# Patient Record
Sex: MALE | Race: WHITE | NOT HISPANIC OR LATINO | Employment: FULL TIME | URBAN - METROPOLITAN AREA
[De-identification: names, ages, dates, MRNs, and addresses within clinical notes are randomized per-mention and may not be internally consistent; named-entity substitution may affect disease eponyms.]

---

## 2022-08-02 ENCOUNTER — HOSPITAL ENCOUNTER (OUTPATIENT)
Facility: HOSPITAL | Age: 57
Discharge: HOME | End: 2022-08-03
Attending: EMERGENCY MEDICINE | Admitting: INTERNAL MEDICINE
Payer: COMMERCIAL

## 2022-08-02 DIAGNOSIS — R06.02 SHORTNESS OF BREATH: ICD-10-CM

## 2022-08-02 DIAGNOSIS — R07.9 CHEST PAIN: ICD-10-CM

## 2022-08-02 DIAGNOSIS — I16.0 HYPERTENSIVE URGENCY: Primary | ICD-10-CM

## 2022-08-02 DIAGNOSIS — R06.09 DYSPNEA ON EXERTION: ICD-10-CM

## 2022-08-02 DIAGNOSIS — R73.03 PREDIABETES: ICD-10-CM

## 2022-08-02 DIAGNOSIS — Z78.9 ALCOHOL USE: ICD-10-CM

## 2022-08-02 DIAGNOSIS — F41.9 ANXIETY: ICD-10-CM

## 2022-08-02 DIAGNOSIS — E78.5 HYPERLIPIDEMIA, UNSPECIFIED HYPERLIPIDEMIA TYPE: ICD-10-CM

## 2022-08-02 DIAGNOSIS — E87.70 HYPERVOLEMIA, UNSPECIFIED HYPERVOLEMIA TYPE: ICD-10-CM

## 2022-08-02 DIAGNOSIS — R79.89 ELEVATED BRAIN NATRIURETIC PEPTIDE (BNP) LEVEL: ICD-10-CM

## 2022-08-02 DIAGNOSIS — R07.9 CHEST PAIN, UNSPECIFIED TYPE: ICD-10-CM

## 2022-08-02 DIAGNOSIS — I10 PRIMARY HYPERTENSION: ICD-10-CM

## 2022-08-02 LAB
ALBUMIN SERPL BCP-MCNC: 4 G/DL (ref 3.5–5.2)
ALP SERPL-CCNC: 79 U/L (ref 55–135)
ALT SERPL W/O P-5'-P-CCNC: 32 U/L (ref 10–44)
ANION GAP SERPL CALC-SCNC: 13 MMOL/L (ref 8–16)
AST SERPL-CCNC: 28 U/L (ref 10–40)
BASOPHILS # BLD AUTO: 0.06 K/UL (ref 0–0.2)
BASOPHILS NFR BLD: 0.8 % (ref 0–1.9)
BILIRUB SERPL-MCNC: 0.7 MG/DL (ref 0.1–1)
BNP SERPL-MCNC: 142 PG/ML (ref 0–99)
BUN SERPL-MCNC: 11 MG/DL (ref 6–20)
CALCIUM SERPL-MCNC: 8.9 MG/DL (ref 8.7–10.5)
CHLORIDE SERPL-SCNC: 104 MMOL/L (ref 95–110)
CHOLEST SERPL-MCNC: 176 MG/DL (ref 120–199)
CHOLEST/HDLC SERPL: 2.8 {RATIO} (ref 2–5)
CO2 SERPL-SCNC: 25 MMOL/L (ref 23–29)
CREAT SERPL-MCNC: 0.8 MG/DL (ref 0.5–1.4)
CTP QC/QA: YES
DIFFERENTIAL METHOD: ABNORMAL
EOSINOPHIL # BLD AUTO: 0.2 K/UL (ref 0–0.5)
EOSINOPHIL NFR BLD: 2.7 % (ref 0–8)
ERYTHROCYTE [DISTWIDTH] IN BLOOD BY AUTOMATED COUNT: 12.4 % (ref 11.5–14.5)
EST. GFR  (NO RACE VARIABLE): >60 ML/MIN/1.73 M^2
ESTIMATED AVG GLUCOSE: 123 MG/DL (ref 68–131)
GLUCOSE SERPL-MCNC: 108 MG/DL (ref 70–110)
HBA1C MFR BLD: 5.9 % (ref 4–5.6)
HCT VFR BLD AUTO: 40.9 % (ref 40–54)
HDLC SERPL-MCNC: 64 MG/DL (ref 40–75)
HDLC SERPL: 36.4 % (ref 20–50)
HGB BLD-MCNC: 14 G/DL (ref 14–18)
IMM GRANULOCYTES # BLD AUTO: 0.03 K/UL (ref 0–0.04)
IMM GRANULOCYTES NFR BLD AUTO: 0.4 % (ref 0–0.5)
LACTATE SERPL-SCNC: 1.1 MMOL/L (ref 0.5–2.2)
LDLC SERPL CALC-MCNC: 94.6 MG/DL (ref 63–159)
LYMPHOCYTES # BLD AUTO: 1.2 K/UL (ref 1–4.8)
LYMPHOCYTES NFR BLD: 15.6 % (ref 18–48)
MCH RBC QN AUTO: 31.7 PG (ref 27–31)
MCHC RBC AUTO-ENTMCNC: 34.2 G/DL (ref 32–36)
MCV RBC AUTO: 93 FL (ref 82–98)
MONOCYTES # BLD AUTO: 0.5 K/UL (ref 0.3–1)
MONOCYTES NFR BLD: 7.1 % (ref 4–15)
NEUTROPHILS # BLD AUTO: 5.5 K/UL (ref 1.8–7.7)
NEUTROPHILS NFR BLD: 73.4 % (ref 38–73)
NONHDLC SERPL-MCNC: 112 MG/DL
NRBC BLD-RTO: 0 /100 WBC
PLATELET # BLD AUTO: 183 K/UL (ref 150–450)
PLATELET BLD QL SMEAR: ABNORMAL
PMV BLD AUTO: 10.8 FL (ref 9.2–12.9)
POTASSIUM SERPL-SCNC: 4.4 MMOL/L (ref 3.5–5.1)
PROT SERPL-MCNC: 7.7 G/DL (ref 6–8.4)
RBC # BLD AUTO: 4.41 M/UL (ref 4.6–6.2)
SARS-COV-2 RDRP RESP QL NAA+PROBE: NEGATIVE
SODIUM SERPL-SCNC: 142 MMOL/L (ref 136–145)
T4 FREE SERPL-MCNC: 0.96 NG/DL (ref 0.71–1.51)
TRIGL SERPL-MCNC: 87 MG/DL (ref 30–150)
TROPONIN I SERPL DL<=0.01 NG/ML-MCNC: 0.01 NG/ML (ref 0–0.03)
TROPONIN I SERPL DL<=0.01 NG/ML-MCNC: <0.006 NG/ML (ref 0–0.03)
TSH SERPL DL<=0.005 MIU/L-ACNC: 5.22 UIU/ML (ref 0.4–4)
WBC # BLD AUTO: 7.44 K/UL (ref 3.9–12.7)

## 2022-08-02 PROCEDURE — G0378 HOSPITAL OBSERVATION PER HR: HCPCS

## 2022-08-02 PROCEDURE — 63600175 PHARM REV CODE 636 W HCPCS: Performed by: STUDENT IN AN ORGANIZED HEALTH CARE EDUCATION/TRAINING PROGRAM

## 2022-08-02 PROCEDURE — 96372 THER/PROPH/DIAG INJ SC/IM: CPT | Mod: 59 | Performed by: STUDENT IN AN ORGANIZED HEALTH CARE EDUCATION/TRAINING PROGRAM

## 2022-08-02 PROCEDURE — 85025 COMPLETE CBC W/AUTO DIFF WBC: CPT | Performed by: NURSE PRACTITIONER

## 2022-08-02 PROCEDURE — 93010 ELECTROCARDIOGRAM REPORT: CPT | Mod: 76,,, | Performed by: INTERNAL MEDICINE

## 2022-08-02 PROCEDURE — 84484 ASSAY OF TROPONIN QUANT: CPT | Performed by: NURSE PRACTITIONER

## 2022-08-02 PROCEDURE — 83036 HEMOGLOBIN GLYCOSYLATED A1C: CPT | Performed by: STUDENT IN AN ORGANIZED HEALTH CARE EDUCATION/TRAINING PROGRAM

## 2022-08-02 PROCEDURE — 84484 ASSAY OF TROPONIN QUANT: CPT | Mod: 91 | Performed by: STUDENT IN AN ORGANIZED HEALTH CARE EDUCATION/TRAINING PROGRAM

## 2022-08-02 PROCEDURE — 93005 ELECTROCARDIOGRAM TRACING: CPT

## 2022-08-02 PROCEDURE — 80061 LIPID PANEL: CPT | Performed by: STUDENT IN AN ORGANIZED HEALTH CARE EDUCATION/TRAINING PROGRAM

## 2022-08-02 PROCEDURE — 80053 COMPREHEN METABOLIC PANEL: CPT | Performed by: NURSE PRACTITIONER

## 2022-08-02 PROCEDURE — U0002 COVID-19 LAB TEST NON-CDC: HCPCS | Performed by: EMERGENCY MEDICINE

## 2022-08-02 PROCEDURE — 25000003 PHARM REV CODE 250: Performed by: NURSE PRACTITIONER

## 2022-08-02 PROCEDURE — 84439 ASSAY OF FREE THYROXINE: CPT | Performed by: STUDENT IN AN ORGANIZED HEALTH CARE EDUCATION/TRAINING PROGRAM

## 2022-08-02 PROCEDURE — 93010 EKG 12-LEAD: ICD-10-PCS | Mod: ,,, | Performed by: INTERNAL MEDICINE

## 2022-08-02 PROCEDURE — 83880 ASSAY OF NATRIURETIC PEPTIDE: CPT | Performed by: NURSE PRACTITIONER

## 2022-08-02 PROCEDURE — 96375 TX/PRO/DX INJ NEW DRUG ADDON: CPT

## 2022-08-02 PROCEDURE — 93010 ELECTROCARDIOGRAM REPORT: CPT | Mod: ,,, | Performed by: INTERNAL MEDICINE

## 2022-08-02 PROCEDURE — 63600175 PHARM REV CODE 636 W HCPCS: Performed by: EMERGENCY MEDICINE

## 2022-08-02 PROCEDURE — 83605 ASSAY OF LACTIC ACID: CPT | Performed by: STUDENT IN AN ORGANIZED HEALTH CARE EDUCATION/TRAINING PROGRAM

## 2022-08-02 PROCEDURE — 99285 EMERGENCY DEPT VISIT HI MDM: CPT | Mod: 25

## 2022-08-02 PROCEDURE — 84443 ASSAY THYROID STIM HORMONE: CPT | Performed by: STUDENT IN AN ORGANIZED HEALTH CARE EDUCATION/TRAINING PROGRAM

## 2022-08-02 RX ORDER — SERTRALINE HYDROCHLORIDE 100 MG/1
100 TABLET, FILM COATED ORAL DAILY
Status: ON HOLD | COMMUNITY
End: 2022-08-03 | Stop reason: SDUPTHER

## 2022-08-02 RX ORDER — SERTRALINE HYDROCHLORIDE 100 MG/1
100 TABLET, FILM COATED ORAL DAILY
Status: DISCONTINUED | OUTPATIENT
Start: 2022-08-03 | End: 2022-08-03 | Stop reason: HOSPADM

## 2022-08-02 RX ORDER — LOSARTAN POTASSIUM 100 MG/1
100 TABLET ORAL DAILY
Status: ON HOLD | COMMUNITY
End: 2022-08-03 | Stop reason: SDUPTHER

## 2022-08-02 RX ORDER — AMLODIPINE BESYLATE 5 MG/1
5 TABLET ORAL DAILY
Status: DISCONTINUED | OUTPATIENT
Start: 2022-08-03 | End: 2022-08-03 | Stop reason: HOSPADM

## 2022-08-02 RX ORDER — IBUPROFEN 200 MG
16 TABLET ORAL
Status: DISCONTINUED | OUTPATIENT
Start: 2022-08-02 | End: 2022-08-03 | Stop reason: HOSPADM

## 2022-08-02 RX ORDER — SIMVASTATIN 40 MG/1
40 TABLET, FILM COATED ORAL DAILY
Status: ON HOLD | COMMUNITY
End: 2022-08-03 | Stop reason: SDUPTHER

## 2022-08-02 RX ORDER — ACETAMINOPHEN 325 MG/1
650 TABLET ORAL EVERY 6 HOURS PRN
Status: DISCONTINUED | OUTPATIENT
Start: 2022-08-02 | End: 2022-08-03 | Stop reason: HOSPADM

## 2022-08-02 RX ORDER — AMLODIPINE BESYLATE 2.5 MG/1
2.5 TABLET ORAL DAILY
Status: ON HOLD | COMMUNITY
End: 2022-08-03 | Stop reason: HOSPADM

## 2022-08-02 RX ORDER — NAPROXEN SODIUM 220 MG/1
81 TABLET, FILM COATED ORAL DAILY
Status: DISCONTINUED | OUTPATIENT
Start: 2022-08-03 | End: 2022-08-03 | Stop reason: HOSPADM

## 2022-08-02 RX ORDER — FUROSEMIDE 10 MG/ML
40 INJECTION INTRAMUSCULAR; INTRAVENOUS
Status: DISCONTINUED | OUTPATIENT
Start: 2022-08-03 | End: 2022-08-03 | Stop reason: HOSPADM

## 2022-08-02 RX ORDER — METOPROLOL TARTRATE 50 MG/1
50 TABLET ORAL DAILY
Status: DISCONTINUED | OUTPATIENT
Start: 2022-08-03 | End: 2022-08-03 | Stop reason: HOSPADM

## 2022-08-02 RX ORDER — NAPROXEN SODIUM 220 MG/1
81 TABLET, FILM COATED ORAL DAILY
Status: ON HOLD | COMMUNITY
End: 2022-08-03 | Stop reason: SDUPTHER

## 2022-08-02 RX ORDER — ACETAMINOPHEN 500 MG
1000 TABLET ORAL
Status: COMPLETED | OUTPATIENT
Start: 2022-08-02 | End: 2022-08-02

## 2022-08-02 RX ORDER — LOSARTAN POTASSIUM 50 MG/1
100 TABLET ORAL DAILY
Status: DISCONTINUED | OUTPATIENT
Start: 2022-08-03 | End: 2022-08-03 | Stop reason: HOSPADM

## 2022-08-02 RX ORDER — ATORVASTATIN CALCIUM 20 MG/1
20 TABLET, FILM COATED ORAL DAILY
Status: DISCONTINUED | OUTPATIENT
Start: 2022-08-03 | End: 2022-08-03 | Stop reason: HOSPADM

## 2022-08-02 RX ORDER — SODIUM CHLORIDE 0.9 % (FLUSH) 0.9 %
10 SYRINGE (ML) INJECTION EVERY 12 HOURS PRN
Status: DISCONTINUED | OUTPATIENT
Start: 2022-08-02 | End: 2022-08-03 | Stop reason: HOSPADM

## 2022-08-02 RX ORDER — IBUPROFEN 200 MG
24 TABLET ORAL
Status: DISCONTINUED | OUTPATIENT
Start: 2022-08-02 | End: 2022-08-03 | Stop reason: HOSPADM

## 2022-08-02 RX ORDER — METOPROLOL TARTRATE 50 MG/1
50 TABLET ORAL DAILY
Status: ON HOLD | COMMUNITY
End: 2022-08-03 | Stop reason: HOSPADM

## 2022-08-02 RX ORDER — ENOXAPARIN SODIUM 100 MG/ML
40 INJECTION SUBCUTANEOUS EVERY 24 HOURS
Status: DISCONTINUED | OUTPATIENT
Start: 2022-08-02 | End: 2022-08-03 | Stop reason: HOSPADM

## 2022-08-02 RX ORDER — GLUCAGON 1 MG
1 KIT INJECTION
Status: DISCONTINUED | OUTPATIENT
Start: 2022-08-02 | End: 2022-08-03 | Stop reason: HOSPADM

## 2022-08-02 RX ORDER — NALOXONE HCL 0.4 MG/ML
0.02 VIAL (ML) INJECTION
Status: DISCONTINUED | OUTPATIENT
Start: 2022-08-02 | End: 2022-08-03 | Stop reason: HOSPADM

## 2022-08-02 RX ORDER — FUROSEMIDE 10 MG/ML
40 INJECTION INTRAMUSCULAR; INTRAVENOUS
Status: COMPLETED | OUTPATIENT
Start: 2022-08-02 | End: 2022-08-02

## 2022-08-02 RX ADMIN — ACETAMINOPHEN 650 MG: 325 TABLET ORAL at 10:08

## 2022-08-02 RX ADMIN — FUROSEMIDE 40 MG: 10 INJECTION, SOLUTION INTRAMUSCULAR; INTRAVENOUS at 05:08

## 2022-08-02 RX ADMIN — ACETAMINOPHEN 1000 MG: 500 TABLET ORAL at 01:08

## 2022-08-02 RX ADMIN — ENOXAPARIN SODIUM 40 MG: 100 INJECTION SUBCUTANEOUS at 07:08

## 2022-08-02 NOTE — ED NOTES
Patient awake,  Resting comfortably in bed. Denies pain. In no acute distress. Still SOB. Talking on phone with family member

## 2022-08-02 NOTE — ED PROVIDER NOTES
Encounter Date: 8/2/2022       History     Chief Complaint   Patient presents with    Shortness of Breath     Sob with excessive coughing and unable to lay flat. Patient stated that he tries to lay down and gets winded. O2 saturations 97% on RA. Dyspnea with exertion      HPI   56-year-old male history of hypertension, obesity presenting with worsening dyspnea on exertion, shortness of breath  Patient is a , it has a poor diet, and sits for long periods   Denies chest pain, nausea vomiting, abdominal pain  Has not seen a physician in many years, had a stress test 8 years ago  Review of patient's allergies indicates:  No Known Allergies  No past medical history on file.  No past surgical history on file.  No family history on file.     Review of Systems   Constitutional: Negative for appetite change, chills, diaphoresis and fever.   HENT: Negative for congestion, nosebleeds, sore throat, trouble swallowing and voice change.    Eyes: Negative for photophobia, pain, redness and itching.   Respiratory: Positive for shortness of breath. Negative for cough and chest tightness.    Cardiovascular: Positive for leg swelling. Negative for chest pain.   Gastrointestinal: Negative for abdominal pain, constipation, diarrhea, nausea and vomiting.   Genitourinary: Negative for decreased urine volume, difficulty urinating, dysuria and frequency.   Musculoskeletal: Negative for gait problem.   Skin: Negative for color change, rash and wound.   Neurological: Negative for dizziness, facial asymmetry, speech difficulty and headaches.   Psychiatric/Behavioral: Negative for agitation, confusion and suicidal ideas.   All other systems reviewed and are negative.      Physical Exam     Initial Vitals [08/02/22 1242]   BP Pulse Resp Temp SpO2   (!) 183/94 69 (!) 22 98.3 °F (36.8 °C) 97 %      MAP       --         Physical Exam    Nursing note and vitals reviewed.  Constitutional:   EXAM  General: Awake, alert and oriented. No  acute distress.  Able to speak 3-4 word sentences before getting short of breath.     Head: normocephalic and atraumatic     Eyes: Conjunctivae are clear without exudates or hemorrhage. Sclera is non-icteric. EOM are intact. Eyelids are normal in appearance without swelling or lesions.     Ears: The external ear and ear canal are non-tender and without swelling. The canal is clear without discharge. Hearing intact.     Nose: Nares are patent bilaterally.     Neck: The neck is supple. Trachea is midline. Full ROM.     Cardiac: Regular rate.     Respiratory: No signs of respiratory distress. No audible wheezes.     Abdominal: Non-distended.     Extremities:  Minimal edema bilateral lower extremities to the ankles.     Skin: Appropriate color for ethnicity.     Neurological: The patient is awake, alert and oriented to person, place, and time with normal speech.     Psychiatric: Appropriate mood and affect.     In light of current/ongoing global covid-19 pandemic, all my encounters w pt were with full ppe including but not limited to gown, gloves, n95, eye protection OR from >6 ft away.             ED Course   Procedures  Labs Reviewed   CBC W/ AUTO DIFFERENTIAL - Abnormal; Notable for the following components:       Result Value    RBC 4.41 (*)     MCH 31.7 (*)     Gran % 73.4 (*)     Lymph % 15.6 (*)     Platelet Estimate Decreased (*)     All other components within normal limits   B-TYPE NATRIURETIC PEPTIDE - Abnormal; Notable for the following components:     (*)     All other components within normal limits   COMPREHENSIVE METABOLIC PANEL   TROPONIN I   SARS-COV-2 RDRP GENE    Narrative:     This test utilizes isothermal nucleic acid amplification   technology to detect the SARS-CoV-2 RdRp nucleic acid segment.   The analytical sensitivity (limit of detection) is 125 genome   equivalents/mL.   A POSITIVE result implies infection with the SARS-CoV-2 virus;   the patient is presumed to be contagious.     A  NEGATIVE result means that SARS-CoV-2 nucleic acids are not   present above the limit of detection. A NEGATIVE result should be   treated as presumptive. It does not rule out the possibility of   COVID-19 and should not be the sole basis for treatment decisions.   If COVID-19 is strongly suspected based on clinical and exposure   history, re-testing using an alternate molecular assay should be   considered.   This test is only for use under the Food and Drug   Administration s Emergency Use Authorization (EUA).   Commercial kits are provided by [employer].   Performance characteristics of the EUA have been independently   verified by [facility] Department of   Pathology and Laboratory Medicine.   _________________________________________________________________   The authorized Fact Sheet for Healthcare Providers and the authorized Fact   Sheet for Patients of the ID NOW COVID-19 are available on the FDA   website:     https://www.fda.gov/media/514807/download  https://www.fda.gov/media/656241/download                ECG Results          EKG 12-lead (Final result)  Result time 08/02/22 15:51:50    Final result by [facility] (08/02/22 15:51:50)                 Narrative:    Test Reason : R06.02,    Vent. Rate : 070 BPM     Atrial Rate : 070 BPM     P-R Int : 144 ms          QRS Dur : 090 ms      QT Int : 416 ms       P-R-T Axes : 070 077 061 degrees     QTc Int : 449 ms    Normal sinus rhythm  clockwise rotation  Within normal limits  No previous ECGs available  Confirmed by Boo Bingham MD ([id number]) on 8/2/2022 3:51:39 PM    Referred By: [email or web address]   SELF           Confirmed By:Boo Bingham MD                            Imaging Results          X-Ray Chest PA And Lateral (Final result)  Result time 08/02/22 13:58:43    Final result by Zana Hardin DO (08/02/22 13:58:43)                 Impression:      See above      Electronically signed by: Zana Hardin  DO  Date:    08/02/2022  Time:    13:58             Narrative:    EXAMINATION:  XR CHEST PA AND LATERAL    CLINICAL HISTORY:  shortness of breath;    TECHNIQUE:  PA and lateral views of the chest were performed.    COMPARISON:  None    FINDINGS:  Nonspecific elevation right lung base.  Slight prominence of the hilar vasculature which may represent early vascular congestion.  There is no lung consolidation.  No pleural effusion or pneumothorax.  Borderline cardiomegaly.  Visualized osseous structures grossly intact.  Clinical correlation and follow-up advised                                 Medications   acetaminophen tablet 1,000 mg (1,000 mg Oral Given 8/2/22 1341)   furosemide injection 40 mg (40 mg Intravenous Given 8/2/22 1704)     Medical Decision Making:   ED Management:  56-year-old male presenting with dyspnea on exertion, shortness of breath.  Patient is deconditioned, as well as morbidly obese.  Signs and symptoms of fluid overload, possible heart failure given multiple risk factors.  Patient is unable to walk 10-15 feet without being severely short of breath.  Plan for Lasix, admission for cardiac workup.  Patient's troponin is negative.                      Clinical Impression:   Final diagnoses:  [R06.02] Shortness of breath  [R06.00] Dyspnea on exertion (Primary)          ED Disposition Condition    Observation               Saira Bobby MD  08/02/22 9410

## 2022-08-02 NOTE — ED NOTES
RN rounding on patient. Patient nervous about hospital. RN spoke to patient and eased patient's mind. Patient resting in bed comfortably denies wanting a blanket. SOB with rest. Still gasping for breathes between short phrases.

## 2022-08-02 NOTE — ED NOTES
PCT performing ambulatory sat with patient. Patient SOB with ambulation, HR >100 with ambulation.

## 2022-08-02 NOTE — PHARMACY MED REC
"  [facility]           Pharmacy  Admission Medication History     The home medication history was taken by Liliana Caal.      Medication history obtained from Medications listed below were obtained from: Patient/family    Based on information gathered for medication list, you may go to "Admission" then "Reconcile Home Medications" tabs to review and/or act upon those items.      The home medication list has been updated by the Pharmacy department.    Please read ALL comments highlighted in yellow.    Please address this information as you see fit.     Feel free to contact us if you have any questions or require assistance.            No current facility-administered medications on file prior to encounter.     Current Outpatient Medications on File Prior to Encounter   Medication Sig Dispense Refill    amLODIPine (NORVASC) 2.5 MG tablet Take 2.5 mg by mouth once daily.      aspirin 81 MG Chew Take 81 mg by mouth once daily.      losartan (COZAAR) 100 MG tablet Take 100 mg by mouth once daily.      metoprolol tartrate (LOPRESSOR) 50 MG tablet Take 50 mg by mouth once daily.      sertraline (ZOLOFT) 100 MG tablet Take 100 mg by mouth once daily.      simvastatin (ZOCOR) 40 MG tablet Take 40 mg by mouth once daily.         Please address this information as you see fit.  Feel free to contact us if you have any questions or require assistance.    Liliana Caal  [phone]                .          "

## 2022-08-02 NOTE — PLAN OF CARE
SW notified of pt's visit to ED, and pt listed is self-pay. CM requests PCP resources for pt to follow up. SW added to After Visit Summary as well as other resources for support.    Sliding Scale PCP Dutch-speaking  [facility]  COVID-19 info: accesshealthla.org  Located in: [facility]  Address: [address], [place] [address]  Phone: [phone]-SU-HAILY [phone])  [facility], Website: www.[facility].org  Services: Health information (including mental health) in Dutch and English, Assistance in finding low-cost Dutch speaking services; Dutch spoken    Community Resource: Financial Assistance, COVID testing, resources for families   https://ovnv.org/resource-hub  [facility] ([facility])  Non-profit organization in [place], [place]  Address: [address] [place] [address]  Phone: [phone]        08/02/22 1726   Post-Acute Status   Hospital Resources/Appts/Education Provided Community resources provided;Post-Acute resouces added to AVS

## 2022-08-02 NOTE — FIRST PROVIDER EVALUATION
Emergency Department TeleTriage Encounter Note      CHIEF COMPLAINT    Chief Complaint   Patient presents with    Shortness of Breath     Sob with excessive coughing and unable to lay flat. Patient stated that he tries to lay down and gets winded. O2 saturations 97% on RA. Dyspnea with exertion        VITAL SIGNS   Initial Vitals [08/02/22 1242]   BP Pulse Resp Temp SpO2   (!) 183/94 69 (!) 22 98.3 °F (36.8 °C) 97 %      MAP       --            ALLERGIES    Review of patient's allergies indicates:  No Known Allergies    PROVIDER TRIAGE NOTE  TeleTriage Note: Shine LANDIN, a nontoxic/well appearing, 56 y.o. male, presented to the ED with c/o SOB that started Sunday with associated leg swelling. Denies chest pain. Pt is speaking in full sentences without respiratory distress.    All ED beds are full at present; patient notified of this status.  Patient seen and medically screened by Nurse Practitioner via teletriage. Orders initiated at triage to expedite care.  Patient is stable to return to the waiting room and will be placed in an ED bed when available.  Care will be transferred to an alternate provider when patient has been placed in an Exam Room from the [facility] for physical exam, additional orders, and disposition.  12:55 PM Jeaneth Fisher, DNP, FNP-C        ORDERS  Labs Reviewed   CBC W/ AUTO DIFFERENTIAL   COMPREHENSIVE METABOLIC PANEL   B-TYPE NATRIURETIC PEPTIDE   TROPONIN I       ED Orders (720h ago, onward)    Start Ordered     Status Ordering Provider    08/02/22 1258 08/02/22 1257  Brain Natriuretic Peptide  STAT         Ordered JEANETH FISHER    08/02/22 1258 08/02/22 1257  Troponin I  STAT         Ordered JEANETH FISHER    08/02/22 1257 08/02/22 1257  CBC Auto Differential  STAT         Ordered JEANETH FISHER    08/02/22 1257 08/02/22 1257  Comprehensive Metabolic Panel  STAT         Ordered JEANETH FISHER    08/02/22 1257 08/02/22 1257  Pulse Oximetry Continuous  Continuous          Ordered [place] ROBBY ORTEGA.    08/02/22 1257 08/02/22 1257  Cardiac Monitoring - Adult  Continuous         Ordered [place], ROBBY ORTEGAMerari    08/02/22 1257 08/02/22 1257  EKG 12-lead  Once         Ordered [place], ROBBY ORTEGA.    08/02/22 1257 08/02/22 1257  X-Ray Chest PA And Lateral  1 time imaging         Ordered FLORIDAROBBY FOLEY            Virtual Visit Note: The provider triage portion of this emergency department evaluation and documentation was performed via [employer], a HIPAA-compliant telemedicine application, in concert with a tele-presenter in the room. A face to face patient evaluation with one of my colleagues will occur once the patient is placed in an emergency department room.      DISCLAIMER: This note was prepared with [employer] voice recognition transcription software. Garbled syntax, mangled pronouns, and other bizarre constructions may be attributed to that software system.

## 2022-08-02 NOTE — H&P
[facility] Medicine H&P Note     Admitting Team: [facility] Hospitalist Team A  Attending Physician: Destinee Dillon MD  Resident:   Intern: Ambrosio    Date of Admit: 8/2/2022    Chief Complaint     Worsening SOB x 1 week    Subjective:      History of Present Illness:  Shine LANDIN is a 56 y.o. male with a past medical history of hypertension who presents with a one week history of progressively worsening shortness of breath. He reports that he first noticed being short of breath 8 years ago and has progressively worsening since then, however it became acutely worse one week ago. He states that he woke up several times two nights ago gasping for air. He endorses chest pressure rated 4-5/10 that sometimes occurs when he is short of breath. The pressure lasts for a few seconds and does not radiate. The shortness of breath and chest pressure is worse with exertion but can also occur when at rest. He endorses fatigue, occasional headache, diaphoresis, and bloating. He also endorses several months of bilateral leg swelling that is worst at the end of the day and improves by morning. He denies dizziness, fever, chills, nausea, vomiting, and abdominal pain.     Of note, patient reports a history of 2 heart catheterizations approximately 5 or 6 years ago that did not require stenting. He also reports that 2 years ago he had an echo and stress test, both of which he said was negative. He has not seen his cardiologist in 2 years.       Past Medical History:  Hypertension  Anxiety    Past Surgical History:  No past surgical history on file.    Allergies:  Review of patient's allergies indicates:  No Known Allergies    Home Medications:  Prior to Admission medications    Medication Sig Start Date End Date Taking? Authorizing Provider   amLODIPine (NORVASC) 2.5 MG tablet Take 2.5 mg by mouth once daily.   Yes Historical Provider   aspirin 81 MG Chew Take 81 mg by mouth once daily.   Yes Historical Provider   losartan (COZAAR) 100  MG tablet Take 100 mg by mouth once daily.   Yes Historical Provider   metoprolol tartrate (LOPRESSOR) 50 MG tablet Take 50 mg by mouth once daily.   Yes Historical Provider   sertraline (ZOLOFT) 100 MG tablet Take 100 mg by mouth once daily.   Yes Historical Provider   simvastatin (ZOCOR) 40 MG tablet Take 40 mg by mouth once daily.   Yes Historical Provider       Family History:  Father - MI at age 50  Multiple uncles - MI at ages 38-60s    Social History:  Patient is originally from [place] and has lived in [place] for the past 40 years. He works as a .   Drinks approximately 12 oz of scotch daily, last drink was 3 days ago on , which is the longest he has gone without drinking in years. He denies ever experiencing withdrawal symptoms.   Denies tobacco and illicit drug use.     Review of Systems:  Review of Systems   Constitutional: Positive for diaphoresis. Negative for chills, fever and weight loss.   HENT: Negative for congestion and sore throat.    Eyes: Negative for blurred vision and double vision.   Respiratory: Positive for cough and shortness of breath. Negative for hemoptysis.    Cardiovascular: Positive for chest pain and leg swelling.   Gastrointestinal: Negative for abdominal pain, nausea and vomiting.   Genitourinary: Negative for dysuria and hematuria.   Musculoskeletal: Negative for joint pain and myalgias.   Neurological: Positive for headaches. Negative for dizziness.     All other systems are reviewed and are negative.      Health Maintaince :   Primary Care Physician: none    Immunizations:   TDap not up to date  Flu not up to date   COVID received first 2 doses, no booster    Cancer Screening:  Colonoscopy: never done       Objective:   Last 24 Hour Vital Signs:  BP  Min: 153/71  Max: 204/100  Temp  Av.3 °F (36.8 °C)  Min: 98 °F (36.7 °C)  Max: 98.9 °F (37.2 °C)  Pulse  Av.4  Min: 69  Max: 101  Resp  Av.3  Min: 20  Max: 24  SpO2  Av.3 %  Min: 95 %  Max: 97  "%  Height  Av' 7" (170.2 cm)  Min: 5' 7" (170.2 cm)  Max: 5' 7" (170.2 cm)  Weight  Av.1 kg (300 lb)  Min: 136.1 kg (300 lb)  Max: 136.1 kg (300 lb)  Body mass index is 46.99 kg/m².  I/O last 3 completed shifts:  In: -   Out: 1000 [Urine:1000]    Physical Examination:  Physical Exam  Vitals reviewed.   Constitutional:       Appearance: He is obese. He is diaphoretic.   HENT:      Head: Normocephalic and atraumatic.      Nose: Nose normal. No rhinorrhea.      Mouth/Throat:      Mouth: Mucous membranes are moist.      Pharynx: Oropharynx is clear.   Eyes:      Extraocular Movements: Extraocular movements intact.      Conjunctiva/sclera: Conjunctivae normal.   Cardiovascular:      Rate and Rhythm: Normal rate and regular rhythm.      Pulses: Normal pulses.      Heart sounds: No murmur heard.    No friction rub. No gallop.   Pulmonary:      Breath sounds: No rhonchi or rales.      Comments: Expiratory wheezes at bases bilaterally  Increased work of breathing while speaking  Abdominal:      General: Bowel sounds are normal.      Tenderness: There is no abdominal tenderness. There is no guarding or rebound.   Musculoskeletal:         General: Normal range of motion.      Cervical back: Normal range of motion. No rigidity.      Comments: 1+ pitting edema bilateral lower extremities   Skin:     General: Skin is warm.      Coloration: Skin is not jaundiced.   Neurological:      General: No focal deficit present.      Mental Status: He is alert and oriented to person, place, and time.   Psychiatric:         Mood and Affect: Mood normal.         Behavior: Behavior normal.         Laboratory:  Most Recent Data:  CBC:   Lab Results   Component Value Date    WBC 7.44 2022    HGB 14.0 2022    HCT 40.9 2022     2022    MCV 93 2022    RDW 12.4 2022     WBC Differential: 73.4 % N, 15.6 % L, 7.1 % M, 2.7 % Eo, 0.8 % Baso    BMP:   Lab Results   Component Value Date     " 08/02/2022    K 4.4 08/02/2022     08/02/2022    CO2 25 08/02/2022    BUN 11 08/02/2022    CREATININE 0.8 08/02/2022     08/02/2022    CALCIUM 8.9 08/02/2022     LFTs:   Lab Results   Component Value Date    PROT 7.7 08/02/2022    ALBUMIN 4.0 08/02/2022    BILITOT 0.7 08/02/2022    AST 28 08/02/2022    ALKPHOS 79 08/02/2022    ALT 32 08/02/2022     Coags: No results found for: INR, PROTIME, PTT  FLP: No results found for: CHOL, HDL, LDLCALC, TRIG, CHOLHDL  DM:   Lab Results   Component Value Date    CREATININE 0.8 08/02/2022     Thyroid: No results found for: TSH, FREET4, J7VVKDB, Y8IFYON, THYROIDAB  Anemia: No results found for: IRON, TIBC, FERRITIN, EKWEIDLX23, FOLATE  Cardiac:   Lab Results   Component Value Date    TROPONINI 0.008 08/02/2022     (H) 08/02/2022     Urinalysis: No results found for: LABURIN, COLORU, PHUA, CLARITYU, SPECGRAV, LABSPEC, NITRITE, PROTEINUR, GLUCOSEU, KETONESU, UROBILINOGEN, BILIRUBINUR, BLOODU, RBCU, WBCUA    Microbiology Data:  POCT covid rapid screen - negative    Other Results:  EKG (my interpretation): normal sinus rhythm    Radiology:  Imaging Results          X-Ray Chest PA And Lateral (Final result)  Result time 08/02/22 13:58:43    Final result by Zana Hardin DO (08/02/22 13:58:43)                 Impression:      See above      Electronically signed by: Zana Hardin DO  Date:    08/02/2022  Time:    13:58             Narrative:    EXAMINATION:  XR CHEST PA AND LATERAL    CLINICAL HISTORY:  shortness of breath;    TECHNIQUE:  PA and lateral views of the chest were performed.    COMPARISON:  None    FINDINGS:  Nonspecific elevation right lung base.  Slight prominence of the hilar vasculature which may represent early vascular congestion.  There is no lung consolidation.  No pleural effusion or pneumothorax.  Borderline cardiomegaly.  Visualized osseous structures grossly intact.  Clinical correlation and follow-up advised                                    Assessment:     Shine LANDIN is a 56 y.o. male with a past medical history of hypertension who presents with a one week history of progressively worsening shortness of breath.    Patient Active Problem List    Diagnosis Date Noted    Hypertensive urgency 08/02/2022    Volume overload 08/02/2022    Elevated brain natriuretic peptide (BNP) level 08/02/2022    Primary hypertension 08/02/2022    HLD (hyperlipidemia) 08/02/2022    Anxiety 08/02/2022    Alcohol use 08/02/2022    Dyspnea on exertion 08/02/2022        Plan:     Volume overload  Dyspnea on exertion  Elevated BNP  - Presented to ED with one week history of worsening shortness of breath associated with chest pressure that worsens with exertion.   - First noticed becoming short of breath 8 years ago. Patient lives in [place] so do not have access to medical records, however per patient had  2 heart catheterizations approximately 5 or 6 years ago that did not require stenting. He also reports that 2 years ago he had an echo and stress test, both of which he said was negative. He has not seen his cardiologist in 2 years.   - On arrival  with BMI of 47, initial trop negative, EKG showed NSR  - CXR showed Nonspecific elevation right lung base. Slight prominence of the hilar vasculature which may represent early vascular congestion. There is no lung consolidation, pleural effusion or pneumothorax.  Borderline cardiomegaly  - In ED received 40mg IV Lasix x1 with good urine output and some improvement of symptoms  - Started on IV lasix 40mg BID  - Follow up TTE  - Resume home antihypertensive medications Losartan 100mg daily, Lopressor 50mg daily, Norvasc 5mg daily   - Strict I/Os  - Daily weights   - On tele  - Consult cards for assessment of new onset heart failure   - Trend troponin and ekgs  - Can consider stress test for complaint of chest pressure    Hypertensive urgency   Hypertension   - /94 on admission, increased to 204/100  an hour later   - Improved with lasix to 153/71  - At time of exam /80  - Resumed home antihypertensive medications Losartan 100mg daily, Lopressor 50mg daily, Norvasc 5mg daily   - Follow up TSH and repeat troponin    Hyperlipidemia  - On Simvastatin 40 mg daily at home  -  Follow up lipid panel  - Simvastatin not on formulary, Atorvastatin 20mg daily while inpatient    Anxiety  - Continue home Sertraline 100mg daily while inpatient    Alcohol Use Disorder  - Drinks approximately 12 oz of scotch daily  - Last drink was 3 days ago on 7/30, which is the longest he has gone without drinking in years.   - Denies ever experiencing withdrawal symptoms.   - Not tachycardic on exam, no tremors  - [facility] protocol to assess for alcohol withdrawal    Healthcare Maintenance  - Needs tdap, flu, covid booster   - colonoscopy as outpatient  - Follow up lipid panel, a1c, tsh    Diet: Cardiac  DVT: scd  IVF: none  Dispo: >48 hours    Code Status:     Full code    Roberto Valente MD  [facility] Internal Medicine HO-1    [facility] Medicine Hospitalist Pager numbers:   [facility] Hospitalist Medicine Team A (Santana/Rashaun): [phone]  [facility] Hospitalist Medicine Team B (Norberto/Pricila):  [phone]

## 2022-08-02 NOTE — ED TRIAGE NOTES
Patient arrived to ED with increased SOB x 2 days. Patient stated this SOB has been going on for a few months intermittently but worsened over the past couple days to the point he was unable to lay down and sleep. + dry, nonproductive cough. Denies CP, NVD, abdominal pain, vision changes, dizziness. Patient gasping for breathes between short phrases. Tachypnea. No use of accessory muscles.

## 2022-08-03 VITALS
DIASTOLIC BLOOD PRESSURE: 71 MMHG | SYSTOLIC BLOOD PRESSURE: 124 MMHG | BODY MASS INDEX: 46.15 KG/M2 | HEART RATE: 68 BPM | OXYGEN SATURATION: 96 % | WEIGHT: 294 LBS | TEMPERATURE: 97 F | HEIGHT: 67 IN | RESPIRATION RATE: 18 BRPM

## 2022-08-03 PROBLEM — R73.03 PREDIABETES: Status: ACTIVE | Noted: 2022-08-03

## 2022-08-03 PROBLEM — E03.8 SUBCLINICAL HYPOTHYROIDISM: Status: ACTIVE | Noted: 2022-08-03

## 2022-08-03 LAB
ALBUMIN SERPL BCP-MCNC: 3.8 G/DL (ref 3.5–5.2)
ALP SERPL-CCNC: 77 U/L (ref 55–135)
ALT SERPL W/O P-5'-P-CCNC: 29 U/L (ref 10–44)
ANION GAP SERPL CALC-SCNC: 12 MMOL/L (ref 8–16)
AORTIC ROOT ANNULUS: 3.55 CM
ASCENDING AORTA: 3.21 CM
AST SERPL-CCNC: 22 U/L (ref 10–40)
AV INDEX (PROSTH): 0.65
AV MEAN GRADIENT: 4 MMHG
AV PEAK GRADIENT: 7 MMHG
AV VALVE AREA: 2.56 CM2
AV VELOCITY RATIO: 0.67
BASOPHILS # BLD AUTO: 0.04 K/UL (ref 0–0.2)
BASOPHILS NFR BLD: 0.5 % (ref 0–1.9)
BILIRUB SERPL-MCNC: 1 MG/DL (ref 0.1–1)
BSA FOR ECHO PROCEDURE: 2.51 M2
BUN SERPL-MCNC: 11 MG/DL (ref 6–20)
CALCIUM SERPL-MCNC: 8.6 MG/DL (ref 8.7–10.5)
CHLORIDE SERPL-SCNC: 102 MMOL/L (ref 95–110)
CO2 SERPL-SCNC: 27 MMOL/L (ref 23–29)
CREAT SERPL-MCNC: 0.8 MG/DL (ref 0.5–1.4)
CV ECHO LV RWT: 0.43 CM
DIFFERENTIAL METHOD: ABNORMAL
DOP CALC AO PEAK VEL: 1.36 M/S
DOP CALC AO VTI: 26.29 CM
DOP CALC LVOT AREA: 3.9 CM2
DOP CALC LVOT DIAMETER: 2.24 CM
DOP CALC LVOT PEAK VEL: 0.91 M/S
DOP CALC LVOT STROKE VOLUME: 67.35 CM3
DOP CALCLVOT PEAK VEL VTI: 17.1 CM
E WAVE DECELERATION TIME: 179.25 MSEC
E/A RATIO: 1.09
E/E' RATIO: 11.57 M/S
ECHO LV POSTERIOR WALL: 1.13 CM (ref 0.6–1.1)
EJECTION FRACTION: 55 %
EOSINOPHIL # BLD AUTO: 0.2 K/UL (ref 0–0.5)
EOSINOPHIL NFR BLD: 2.2 % (ref 0–8)
ERYTHROCYTE [DISTWIDTH] IN BLOOD BY AUTOMATED COUNT: 12.4 % (ref 11.5–14.5)
EST. GFR  (NO RACE VARIABLE): >60 ML/MIN/1.73 M^2
FOLATE SERPL-MCNC: 6.7 NG/ML (ref 4–24)
FRACTIONAL SHORTENING: 25 % (ref 28–44)
GLUCOSE SERPL-MCNC: 108 MG/DL (ref 70–110)
HCT VFR BLD AUTO: 44.1 % (ref 40–54)
HGB BLD-MCNC: 14.9 G/DL (ref 14–18)
IMM GRANULOCYTES # BLD AUTO: 0.02 K/UL (ref 0–0.04)
IMM GRANULOCYTES NFR BLD AUTO: 0.2 % (ref 0–0.5)
INTERVENTRICULAR SEPTUM: 0.91 CM (ref 0.6–1.1)
IVRT: 105.61 MSEC
LA MAJOR: 6.72 CM
LA MINOR: 6.22 CM
LA WIDTH: 5.47 CM
LEFT ATRIUM SIZE: 4.53 CM
LEFT ATRIUM VOLUME INDEX MOD: 45.1 ML/M2
LEFT ATRIUM VOLUME INDEX: 57.2 ML/M2
LEFT ATRIUM VOLUME MOD: 107.43 CM3
LEFT ATRIUM VOLUME: 136.07 CM3
LEFT INTERNAL DIMENSION IN SYSTOLE: 3.9 CM (ref 2.1–4)
LEFT VENTRICLE DIASTOLIC VOLUME INDEX: 55.17 ML/M2
LEFT VENTRICLE DIASTOLIC VOLUME: 131.3 ML
LEFT VENTRICLE MASS INDEX: 85 G/M2
LEFT VENTRICLE SYSTOLIC VOLUME INDEX: 27.7 ML/M2
LEFT VENTRICLE SYSTOLIC VOLUME: 65.92 ML
LEFT VENTRICULAR INTERNAL DIMENSION IN DIASTOLE: 5.23 CM (ref 3.5–6)
LEFT VENTRICULAR MASS: 201.27 G
LV LATERAL E/E' RATIO: 13.5 M/S
LV SEPTAL E/E' RATIO: 10.13 M/S
LYMPHOCYTES # BLD AUTO: 1.2 K/UL (ref 1–4.8)
LYMPHOCYTES NFR BLD: 14.1 % (ref 18–48)
MAGNESIUM SERPL-MCNC: 1.8 MG/DL (ref 1.6–2.6)
MCH RBC QN AUTO: 31.2 PG (ref 27–31)
MCHC RBC AUTO-ENTMCNC: 33.8 G/DL (ref 32–36)
MCV RBC AUTO: 92 FL (ref 82–98)
MONOCYTES # BLD AUTO: 0.6 K/UL (ref 0.3–1)
MONOCYTES NFR BLD: 7.7 % (ref 4–15)
MV A" WAVE DURATION": 11.7 MSEC
MV PEAK A VEL: 0.74 M/S
MV PEAK E VEL: 0.81 M/S
MV STENOSIS PRESSURE HALF TIME: 51.98 MS
MV VALVE AREA P 1/2 METHOD: 4.23 CM2
NEUTROPHILS # BLD AUTO: 6.2 K/UL (ref 1.8–7.7)
NEUTROPHILS NFR BLD: 75.3 % (ref 38–73)
NRBC BLD-RTO: 0 /100 WBC
PHOSPHATE SERPL-MCNC: 3.8 MG/DL (ref 2.7–4.5)
PISA TR MAX VEL: 2.79 M/S
PLATELET # BLD AUTO: 204 K/UL (ref 150–450)
PMV BLD AUTO: 10.1 FL (ref 9.2–12.9)
POTASSIUM SERPL-SCNC: 3.7 MMOL/L (ref 3.5–5.1)
PROT SERPL-MCNC: 7.3 G/DL (ref 6–8.4)
PULM VEIN S/D RATIO: 1.19
PV PEAK D VEL: 0.16 M/S
PV PEAK S VEL: 0.19 M/S
RA MAJOR: 5.5 CM
RA WIDTH: 4.43 CM
RBC # BLD AUTO: 4.78 M/UL (ref 4.6–6.2)
RIGHT VENTRICULAR END-DIASTOLIC DIMENSION: 4.37 CM
RV TISSUE DOPPLER FREE WALL SYSTOLIC VELOCITY 1 (APICAL 4 CHAMBER VIEW): 11.76 CM/S
SODIUM SERPL-SCNC: 141 MMOL/L (ref 136–145)
STJ: 3.26 CM
TDI LATERAL: 0.06 M/S
TDI SEPTAL: 0.08 M/S
TDI: 0.07 M/S
TR MAX PG: 31 MMHG
TRICUSPID ANNULAR PLANE SYSTOLIC EXCURSION: 2.16 CM
VIT B12 SERPL-MCNC: 278 PG/ML (ref 210–950)
WBC # BLD AUTO: 8.22 K/UL (ref 3.9–12.7)

## 2022-08-03 PROCEDURE — 94761 N-INVAS EAR/PLS OXIMETRY MLT: CPT

## 2022-08-03 PROCEDURE — 25000003 PHARM REV CODE 250: Performed by: STUDENT IN AN ORGANIZED HEALTH CARE EDUCATION/TRAINING PROGRAM

## 2022-08-03 PROCEDURE — 36415 COLL VENOUS BLD VENIPUNCTURE: CPT | Performed by: STUDENT IN AN ORGANIZED HEALTH CARE EDUCATION/TRAINING PROGRAM

## 2022-08-03 PROCEDURE — 96365 THER/PROPH/DIAG IV INF INIT: CPT

## 2022-08-03 PROCEDURE — 96376 TX/PRO/DX INJ SAME DRUG ADON: CPT

## 2022-08-03 PROCEDURE — 63600175 PHARM REV CODE 636 W HCPCS: Performed by: STUDENT IN AN ORGANIZED HEALTH CARE EDUCATION/TRAINING PROGRAM

## 2022-08-03 PROCEDURE — 85025 COMPLETE CBC W/AUTO DIFF WBC: CPT | Performed by: STUDENT IN AN ORGANIZED HEALTH CARE EDUCATION/TRAINING PROGRAM

## 2022-08-03 PROCEDURE — 82746 ASSAY OF FOLIC ACID SERUM: CPT | Performed by: STUDENT IN AN ORGANIZED HEALTH CARE EDUCATION/TRAINING PROGRAM

## 2022-08-03 PROCEDURE — 84425 ASSAY OF VITAMIN B-1: CPT | Performed by: STUDENT IN AN ORGANIZED HEALTH CARE EDUCATION/TRAINING PROGRAM

## 2022-08-03 PROCEDURE — 80053 COMPREHEN METABOLIC PANEL: CPT | Performed by: STUDENT IN AN ORGANIZED HEALTH CARE EDUCATION/TRAINING PROGRAM

## 2022-08-03 PROCEDURE — 84100 ASSAY OF PHOSPHORUS: CPT | Performed by: STUDENT IN AN ORGANIZED HEALTH CARE EDUCATION/TRAINING PROGRAM

## 2022-08-03 PROCEDURE — 82607 VITAMIN B-12: CPT | Performed by: STUDENT IN AN ORGANIZED HEALTH CARE EDUCATION/TRAINING PROGRAM

## 2022-08-03 PROCEDURE — 25000003 PHARM REV CODE 250: Performed by: NURSE PRACTITIONER

## 2022-08-03 PROCEDURE — 83735 ASSAY OF MAGNESIUM: CPT | Performed by: STUDENT IN AN ORGANIZED HEALTH CARE EDUCATION/TRAINING PROGRAM

## 2022-08-03 PROCEDURE — G0378 HOSPITAL OBSERVATION PER HR: HCPCS

## 2022-08-03 RX ORDER — GABAPENTIN 300 MG/1
CAPSULE ORAL
Qty: 13 CAPSULE | Refills: 0 | Status: SHIPPED | OUTPATIENT
Start: 2022-08-03 | End: 2022-08-08

## 2022-08-03 RX ORDER — THIAMINE HCL 100 MG
100 TABLET ORAL DAILY
Status: DISCONTINUED | OUTPATIENT
Start: 2022-08-03 | End: 2022-08-03

## 2022-08-03 RX ORDER — SIMVASTATIN 40 MG/1
40 TABLET, FILM COATED ORAL DAILY
Qty: 90 TABLET | Refills: 3 | Status: SHIPPED | OUTPATIENT
Start: 2022-08-03 | End: 2023-08-03

## 2022-08-03 RX ORDER — IBUPROFEN 400 MG/1
400 TABLET ORAL EVERY 6 HOURS PRN
Status: DISCONTINUED | OUTPATIENT
Start: 2022-08-03 | End: 2022-08-03 | Stop reason: HOSPADM

## 2022-08-03 RX ORDER — ONDANSETRON 8 MG/1
8 TABLET, ORALLY DISINTEGRATING ORAL ONCE
Status: COMPLETED | OUTPATIENT
Start: 2022-08-03 | End: 2022-08-03

## 2022-08-03 RX ORDER — THIAMINE HCL 100 MG
100 TABLET ORAL DAILY
Status: DISCONTINUED | OUTPATIENT
Start: 2022-08-04 | End: 2022-08-03 | Stop reason: HOSPADM

## 2022-08-03 RX ORDER — LOSARTAN POTASSIUM 100 MG/1
100 TABLET ORAL DAILY
Qty: 90 TABLET | Refills: 3 | Status: SHIPPED | OUTPATIENT
Start: 2022-08-03 | End: 2023-08-03

## 2022-08-03 RX ORDER — SERTRALINE HYDROCHLORIDE 100 MG/1
100 TABLET, FILM COATED ORAL DAILY
Qty: 90 TABLET | Refills: 3 | Status: SHIPPED | OUTPATIENT
Start: 2022-08-03 | End: 2023-08-03

## 2022-08-03 RX ORDER — NAPROXEN SODIUM 220 MG/1
81 TABLET, FILM COATED ORAL DAILY
Qty: 90 TABLET | Refills: 3 | Status: SHIPPED | OUTPATIENT
Start: 2022-08-03 | End: 2023-08-03

## 2022-08-03 RX ORDER — DIAZEPAM 5 MG/1
10 TABLET ORAL EVERY 6 HOURS PRN
Status: DISCONTINUED | OUTPATIENT
Start: 2022-08-03 | End: 2022-08-03

## 2022-08-03 RX ORDER — CARVEDILOL 6.25 MG/1
6.25 TABLET ORAL 2 TIMES DAILY WITH MEALS
Qty: 180 TABLET | Refills: 3 | Status: SHIPPED | OUTPATIENT
Start: 2022-08-03 | End: 2023-08-03

## 2022-08-03 RX ORDER — GABAPENTIN 300 MG/1
CAPSULE ORAL
Qty: 13 CAPSULE | Refills: 0 | Status: SHIPPED | OUTPATIENT
Start: 2022-08-03 | End: 2022-08-03 | Stop reason: SDUPTHER

## 2022-08-03 RX ORDER — AMLODIPINE BESYLATE 10 MG/1
10 TABLET ORAL DAILY
Qty: 90 TABLET | Refills: 3 | Status: SHIPPED | OUTPATIENT
Start: 2022-08-03 | End: 2023-08-03

## 2022-08-03 RX ORDER — DIAZEPAM 5 MG/1
10 TABLET ORAL EVERY 6 HOURS PRN
Status: DISCONTINUED | OUTPATIENT
Start: 2022-08-03 | End: 2022-08-03 | Stop reason: HOSPADM

## 2022-08-03 RX ORDER — FAMOTIDINE 20 MG/1
20 TABLET, FILM COATED ORAL NIGHTLY
Status: DISCONTINUED | OUTPATIENT
Start: 2022-08-03 | End: 2022-08-03 | Stop reason: HOSPADM

## 2022-08-03 RX ORDER — FOLIC ACID 1 MG/1
1 TABLET ORAL DAILY
Status: DISCONTINUED | OUTPATIENT
Start: 2022-08-03 | End: 2022-08-03 | Stop reason: HOSPADM

## 2022-08-03 RX ORDER — THIAMINE HCL 100 MG
100 TABLET ORAL DAILY
Status: DISCONTINUED | OUTPATIENT
Start: 2022-08-04 | End: 2022-08-03

## 2022-08-03 RX ADMIN — FAMOTIDINE 20 MG: 20 TABLET ORAL at 03:08

## 2022-08-03 RX ADMIN — FUROSEMIDE 40 MG: 10 INJECTION, SOLUTION INTRAMUSCULAR; INTRAVENOUS at 05:08

## 2022-08-03 RX ADMIN — THIAMINE HYDROCHLORIDE 100 MG: 100 INJECTION, SOLUTION INTRAMUSCULAR; INTRAVENOUS at 05:08

## 2022-08-03 RX ADMIN — SERTRALINE 100 MG: 100 TABLET, FILM COATED ORAL at 08:08

## 2022-08-03 RX ADMIN — ACETAMINOPHEN 650 MG: 325 TABLET ORAL at 02:08

## 2022-08-03 RX ADMIN — IBUPROFEN 400 MG: 400 TABLET, FILM COATED ORAL at 03:08

## 2022-08-03 RX ADMIN — DIAZEPAM 10 MG: 5 TABLET ORAL at 03:08

## 2022-08-03 RX ADMIN — LOSARTAN POTASSIUM 100 MG: 50 TABLET, FILM COATED ORAL at 08:08

## 2022-08-03 RX ADMIN — METOPROLOL TARTRATE 50 MG: 50 TABLET, FILM COATED ORAL at 08:08

## 2022-08-03 RX ADMIN — ATORVASTATIN CALCIUM 20 MG: 20 TABLET, FILM COATED ORAL at 08:08

## 2022-08-03 RX ADMIN — AMLODIPINE BESYLATE 5 MG: 5 TABLET ORAL at 08:08

## 2022-08-03 RX ADMIN — ASPIRIN 81 MG: 81 TABLET, CHEWABLE ORAL at 08:08

## 2022-08-03 RX ADMIN — DIAZEPAM 10 MG: 5 TABLET ORAL at 02:08

## 2022-08-03 RX ADMIN — THERA TABS 1 TABLET: TAB at 08:08

## 2022-08-03 RX ADMIN — FOLIC ACID 1 MG: 1 TABLET ORAL at 08:08

## 2022-08-03 RX ADMIN — ONDANSETRON 8 MG: 8 TABLET, ORALLY DISINTEGRATING ORAL at 03:08

## 2022-08-03 NOTE — ED NOTES
Patient resting in bed. Lab at bedside. Patient updated on plan of care. States that his SOB is better at rest and he is happy that he can lay down and feel like he can breath. Patient complaints of SOB with activity still. Talking in full sentences at rest.

## 2022-08-03 NOTE — NURSING
Patient requested nurse to come to room. Upon entering his room patient began to tell me that he is a daily drinking and have been ever since his mom passed away and that he never experienced withdrawal symptoms because he never stopped. At 0130 patient CIWA score was a 7 and now he c/o worsened headache, N/V and severe tremors. Spoke with resident and he will review patient's chart and place the appropriate order.

## 2022-08-03 NOTE — PLAN OF CARE
[facility] - Telemetry  Initial Discharge Assessment       Primary Care Provider: No primary care provider on file.    Admission Diagnosis: Shortness of breath [R06.02]  Dyspnea on exertion [R06.00]  Chest pain [R07.9]    Admission Date: 8/2/2022  Expected Discharge Date: 8/3/2022    Payor: GENERIC COMMERCIAL / Plan: GENERIC COMMERCIAL / Product Type: Commercial /     Extended Emergency Contact Information  Primary Emergency Contact: Nicki [place]  [place]  Mobile Phone: [phone]  Relation: Sister  Secondary Emergency Contact: PrasannaTamie  [place]  Mobile Phone: [phone]  Relation: Daughter    Discharge Plan A: (P) Home with family  Discharge Plan B: (P) [facility]      [facility] [address] [address] [address] [address]  Phone: [phone] Fax: [phone]      Initial Assessment (most recent)       Adult Discharge Assessment - 08/03/22 1140          Discharge Assessment    Assessment Type Discharge Planning Assessment (P)      Confirmed/corrected address, phone number and insurance Yes (P)      Confirmed Demographics Correct on Facesheet (P)      Source of Information patient (P)      Communicated SALAS with patient/caregiver Date not available/Unable to determine (P)      Lives With parent(s) (P)      Do you expect to return to your current living situation? Yes (P)      Do you have help at home or someone to help you manage your care at home? Yes (P)      Prior to hospitilization cognitive status: Alert/Oriented (P)      Current cognitive status: Alert/Oriented (P)      Walking or Climbing Stairs Difficulty none (P)      Dressing/Bathing Difficulty none (P)      Equipment Currently Used at Home none (P)      Readmission within 30 days? No (P)      Patient currently being followed by outpatient case management? No (P)      Do you currently have service(s) that help you manage your care at home? No (P)      Do you take prescription medications? Yes (P)      Do you have  prescription coverage? Yes (P)      Do you have any problems affording any of your prescribed medications? No (P)      Is the patient taking medications as prescribed? yes (P)      How do you get to doctors appointments? car, drives self (P)      Are you on dialysis? No (P)      Do you take coumadin? No (P)      Discharge Plan A Home with family (P)      Discharge Plan B Home Health (P)      DME Needed Upon Discharge  none (P)      Discharge Plan discussed with: Patient (P)                       1140  Patient resting quietly in bed when CM rounded. No family present. Patient was admitted with volume overload.    Patient lives with his father in [place], is independent of all ADLs, & denied the need for assistance with transportation at time of discharge. Patient is a  & plans to return to [place] following his discharge.     CM informed the patient of hospfu appts needed with his PCP & cardiologist. Patient stated that he has been seen by Dr Nathen Lieberman ([facility] [address], [place], [address], (p) [phone], (f) [phone]) for both PCP & cards. CM was informed by Dr. Lieberman's office that the patient needs to call to schedule the hospfu appt. Information added to the patient's discharge paperwork.     CM updated patient's whiteboard with CM name & contact information.     1510  DC order noted. Patient in agreement with plan to discharge home today, denied the need for assistance with transportation at time of discharge, & verbalized understanding that he needs to call Dr. Lieberman's office to scheduled his PCP/cardiology hospfu appt.      Will continue to follow.

## 2022-08-03 NOTE — PROGRESS NOTES
08/02/22 2117   Admission   Initial VN Admission Questions Complete   Communication Issues? None   Shift   Virtual Nurse - Patient Verbalized Approval Of Camera Use   Safety/Activity   Patient Rounds visualized patient   Safety Promotion/Fall Prevention instructed to call staff for mobility;side rails raised x 2;Fall Risk reviewed with patient/family   VIRTUAL NURSE: Admission questions completed with patient at this time. Care plan and safety precautions reviewed. Pt verbalized no complaints, no needs expressed. Instructed to use call light for assistance, he verbalized understanding.

## 2022-08-03 NOTE — DISCHARGE SUMMARY
[facility] Medicine Discharge Summary    Primary Team: [facility] Hospitalist Team   Attending Physician: Destinee Dillon MD  Resident:   Intern: Ambrosio    Date of Admit: 8/2/2022  Date of Discharge: 8/3/2022    Discharge to: Home  Condition: Stable    Discharge Diagnoses       Patient Active Problem List   Diagnosis    Hypertensive urgency    Volume overload    Elevated brain natriuretic peptide (BNP) level    Primary hypertension    HLD (hyperlipidemia)    Anxiety    Alcohol use    Dyspnea on exertion    Prediabetes    Subclinical hypothyroidism    Chest pain    Shortness of breath       Consultants and Procedures     Consultants:  none    Procedures:   none    Imaging:  CXR 8/2:  Nonspecific elevation right lung base.  Slight prominence of the hilar vasculature which may represent early vascular congestion.  There is no lung consolidation.  No pleural effusion or pneumothorax.  Borderline cardiomegaly.  Visualized osseous structures grossly intact. Clinical correlation and follow-up advised    TTE 8/3:  · Concentric remodeling and normal systolic function.  · Moderate left atrial enlargement.  · The estimated ejection fraction is 55%.  · Grade I left ventricular diastolic dysfunction.  · Mild right ventricular enlargement with normal right ventricular systolic function.  · Mild tricuspid regurgitation.  · There is no pulmonary hypertension.    Brief History of Present Illness      For the full HPI please refer to the History & Physical from this admission.    Shine LANDIN is a 56 y.o. male with a past medical history of hypertension who presented to ED on 8/2 with a one week history of progressively worsening shortness of breath. He reported chronic SOB with acute exacerbation in the past week with associated orthopnea.  He endorsed chest pressure varying from a few seconds to a few minutes rated 4-5/10 that sometimes occurred when he was short of breath with no radiation. The shortness of breath and  chest pressure are worse with exertion but can also occur when at rest. He endorsed fatigue, occasional headache, diaphoresis, and bloating. He also endorsed several months of bilateral leg swelling that was worst at the end of the day and improved by morning. He denied dizziness, fever, chills, nausea, vomiting, and abdominal pain.      Of note, patient reported a history of 2 heart catheterizations approximately 5 or 6 years ago that did not require stenting. He also reported that 2 years ago he had an echo and stress test, both of which he said were negative. He has not seen his cardiologist in 2 years.        Hospital Course By Problem with Pertinent Findings     Flash Pulmonary Edema secondary to HTN urgency  - Presented to ED with one week history of worsening shortness of breath associated with chest pressure that worsens with exertion.   - First noticed becoming short of breath 8 years ago. Patient lives in [place] so do not have access to medical records, however per patient had  2 heart catheterizations approximately 5 or 6 years ago that did not require stenting. He also reports that 2 years ago he had an echo and stress test, both of which he said was negative. He has not seen his cardiologist in 2 years.   - On arrival  with BMI of 47, initial trop negative, EKG showed NSR  - CXR showed Nonspecific elevation right lung base. Slight prominence of the hilar vasculature which may represent early vascular congestion. There is no lung consolidation, pleural effusion or pneumothorax.  Borderline cardiomegaly  - /94 on admission, increased to 204/100 an hour later   - Improved with lasix to 153/71  - In ED received 40mg IV Lasix x1 with good urine output and some improvement of symptoms  - Started on IV lasix 40mg BID  - At time of exam /80  - Resumed home antihypertensive medications Losartan 100mg daily, Lopressor 50mg daily, Norvasc 5mg daily   - 8/3: Patient reported significant  improvement in symptoms, with UOP of over 4 L since admission, denies pressure in chest or SOB  - TTE 8/3: EF 55%, grade I LV diastolic dysfunction, mild RV enlargement, mild tricuspid regurg  - Discharge home on: losartan 100 mg, Coreg 6.25 BID, Norvasc 10 mg  - Can consider future stress test for complaint of chest pressure  - Counseled patient on importance of low salt diet     Hyperlipidemia  - On Simvastatin 40 mg daily at home  - Simvastatin not on formulary, Atorvastatin 20mg daily while inpatient  - Lipid Panel: Total: 176  Triglycerides: 87  HDL 64  LDL 94.6   - Resume home simvastatin as outpatient      Anxiety  - Continue Sertraline 100mg daily     Alcohol Use Disorder  - Drinks approximately 12 oz of scotch daily  - Last drink was on 7/30, which was the longest he has gone without drinking in years.   - Denies ever experiencing withdrawal symptoms.   - [facility] protocol to assess for alcohol withdrawal  - 8/2: CIWA 8, was experiencing some tremors, anxiety and nausea, gave diazepam 10 mg and q6 PRN as needed  - Patient interested in alcohol cessation  - Will discharge home with gabapentin 300 mg TID for withdrawal    Prediabetes  - Hemoglobin A1c: 5.9  - Counseled on diet and lifestyle modifications, recommend follow up with PCP     Healthcare Maintenance  - Needs tdap, flu, covid booster   - Colonoscopy as outpatient  - Instructed to establish with PCP in [place], and f/u with cardiologist for medication management    Discharge Medications        Medication List      START taking these medications    carvediloL 6.25 MG tablet  Commonly known as: COREG  Take 1 tablet (6.25 mg total) by mouth 2 (two) times daily with meals.        CHANGE how you take these medications    amLODIPine 10 MG tablet  Commonly known as: NORVASC  Take 1 tablet (10 mg total) by mouth once daily.  What changed:   · medication strength  · how much to take        CONTINUE taking these medications    aspirin 81 MG Chew     losartan 100  MG tablet  Commonly known as: COZAAR     sertraline 100 MG tablet  Commonly known as: ZOLOFT     simvastatin 40 MG tablet  Commonly known as: ZOCOR        STOP taking these medications    metoprolol tartrate 50 MG tablet  Commonly known as: LOPRESSOR           Where to Get Your Medications      These medications were sent to [facility] [id number]    Hours: Mon-Fri, 8a-5:30p Phone: [phone]   · amLODIPine 10 MG tablet  · carvediloL 6.25 MG tablet         Discharge Information:     Diet:  Cardiac, low salt 2g    Physical Activity:  As tolerated             Instructions:  1. Take all medications as prescribed  2. Keep all follow-up appointments  3. Return to the hospital or call your primary care physicians if any worsening symptoms such as fever, chest pain, shortness of breath, return of symptoms, or any other concerns.    Follow-Up Appointments:  PCP, Cardiology    Roberto Valente MD  [facility] Internal Medicine -I  [facility] Internal Medicine Team A

## 2022-08-03 NOTE — PLAN OF CARE
Discharge orders noted. Additional clinical references attached. Patient's discharge instructions given by bedside RN and reviewed by this VN with patient. Education provided on home care instructions, new and previous medications, diagnosis, when to seek medical attention, and follow-up appointments. New medications delivered by pharmacy. Patient verbalized understanding and teach back method was used. Patient stated he will call an Uber for transportation home. All questions answered. Transport to [facility] requested. Floor nurse notified.

## 2022-08-03 NOTE — PLAN OF CARE
Plan of care    Notified by nurse that pt experiencing dry heaves and increased hand tremors. Pt concerned for EtOH withdrawals. Last CIWA 7 per nurse new CIWA 8.    MD presented to the bedside. Pt HDS with no overt acute distress. Pt mentions ambulating to bathroom urinating with out issue then experiencing dry heave with some mucus produced. Pt denies audio/visual hallucinations or skin crawling/itching. Does have bilateral hand tremor with outstretched arms, mild HA since admission, and nausea. Vitals: T - 97.1, 139/65, HR 74, Resp 20, sat 95% on RA. CIWA on my exam 10. States last drink on Sunday night.     Will initiate thiamine, folate, MV, pepcid nightly, zofran 8mg tab, diazepam 10mg q6 PRN with vitals as parameters for 4 dose then diazepam 5mg q6 prn for 8 doses for concern of EtOHwithdrawals onset.       Parminder Patel MD   [facility]  [facility]

## 2022-08-03 NOTE — PROGRESS NOTES
"[facility] Hospital Medicine Progress Note    Primary Team: [facility] Hospitalist Team A  Attending Physician: Destinee Dillon MD  Resident:   Intern: ying    Subjective:      Pt had an episode of dry heaves, headache, hand tremor overnight which was alleviated with the start of a valium 10mg taper. Pt VSS were stable and he denied auditory and visual hallucinations. He was able to sleep overnight. TTE pending this morning. Good UOP with diuresis.      Objective:     Last 24 Hour Vital Signs:  BP  Min: 128/80  Max: 204/100  Temp  Av.7 °F (36.5 °C)  Min: 96.8 °F (36 °C)  Max: 98.9 °F (37.2 °C)  Pulse  Av.4  Min: 69  Max: 101  Resp  Av.5  Min: 16  Max: 24  SpO2  Av.2 %  Min: 95 %  Max: 97 %  Height  Av' 7" (170.2 cm)  Min: 5' 7" (170.2 cm)  Max: 5' 7" (170.2 cm)  Weight  Av.8 kg (297 lb 2.5 oz)  Min: 133.5 kg (294 lb 5 oz)  Max: 136.1 kg (300 lb)  I/O last 3 completed shifts:  In: -   Out: 2650 [Urine:2650]    Physical Examination:  General: obese alert, no acute distress, conversant without increased work of breathing  Head: Normocephalic, atraumatic  Eyes: EOMI, PERRL, conjunctiva normal  Throat: Normal mucosa  Neck: Supple, no carotid bruit bilaterally, JVP unable to assess due to habitus   Cardiovascular: RRR, S1/S2, no MRG  Chest: CTAB with mild right basilar moist crackles, no wheeze, unlabored breathing  Abdomen: Protuberant Soft, nondistended, nontender, NABS  Extremities: No cyanosis, clubbing, no lesions 1+ edema at the feet,    Pulses: 2+ in all extremities  Neurologic: AOx4,moving all extremities w/out issue, full strength, sensation throughout      Laboratory:  Laboratory Data Reviewed: yes  Pertinent Findings:  A1c 5.9   Trop unremarkable   TSH 5.22   T4 0.96    Microbiology Data Reviewed: yes  Pertinent Findings:  None     Other Results:  Radiology Data Reviewed: yes  Pertinent Findings:  TTE pending     Current Medications:     Infusions:       Scheduled:   amLODIPine  5 mg Oral " Daily    aspirin  81 mg Oral Daily    atorvastatin  20 mg Oral Daily    enoxaparin  40 mg Subcutaneous Daily    famotidine  20 mg Oral QHS    folic acid  1 mg Oral Daily    furosemide (LASIX) injection  40 mg Intravenous Q12H    losartan  100 mg Oral Daily    metoprolol tartrate  50 mg Oral Daily    multivitamin  1 tablet Oral Daily    sertraline  100 mg Oral Daily    [START ON 8/4/2022] thiamine  100 mg Oral Daily        PRN:  acetaminophen, dextrose 10%, dextrose 10%, diazePAM, glucagon (human recombinant), glucose, glucose, ibuprofen, naloxone, sodium chloride 0.9%    Antibiotics and Day Number of Therapy:  NONE    Lines and Day Number of Therapy:  PIV    Assessment:       Patient Active Problem List    Diagnosis Date Noted    Hypertensive urgency 08/02/2022    Volume overload 08/02/2022    Elevated brain natriuretic peptide (BNP) level 08/02/2022    Primary hypertension 08/02/2022    HLD (hyperlipidemia) 08/02/2022    Anxiety 08/02/2022    Alcohol use 08/02/2022    Dyspnea on exertion 08/02/2022      Shine LANDIN is a 56 y.o. male with a past medical history of hypertension who presents with a one week history of progressively worsening shortness of breath that is improving today s/p IV diuresis. Pt also beginning to have EtOH withdrawal episodes valium taper started will continue to monitor. Pt describes significant improvement with initiation.      Plan:     Volume overload  Dyspnea on exertion  Elevated BNP  - Presented to ED with one week history of worsening shortness of breath associated with chest pressure that worsens with exertion.   - First noticed becoming short of breath 8 years ago. Patient lives in [place] so do not have access to medical records, however per patient had  2 heart catheterizations approximately 5 or 6 years ago that did not require stenting. He also reports that 2 years ago he had an echo and stress test, both of which he said was negative. He has not seen his  cardiologist in 2 years.   -  with BMI of 47, initial trop negative, EKG showed NSR  - CXR showed Nonspecific elevation right lung base. Slight prominence of the hilar vasculature which may represent early vascular congestion. There is no lung consolidation, pleural effusion or pneumothorax.  Borderline cardiomegaly  - Strict I/Os, daily wts,    - On tele- responded to 40mg IV Lasix with good UOP (2.6L) and sxs improvement   - continue IV lasix 40mg BID  - Resumed home antihypertensive medications Losartan 100mg daily, Lopressor 50mg daily, Norvasc 5mg daily   - Consult cards for assessment of new onset heart failure after TTE   - stopped trop and EKG due to down trending trops   -TTE pending today    Alcohol Withdrawals 2/2 to dependence   - Drinks approximately 12 oz of scotch daily 2/2 mothers death  - Last drink on 7/31, which is the longest he has gone without drinking in 2 years.   - Denies ever experiencing withdrawal symptoms  - overnight had episode of tremors, dry heave, headache with VSS  - started on valium taper 10mg q6prn x4, MV, folate, thiamine, pepcid, ibuprophen, zofran   - pt endorsed significant improvement after taper initiation    Hypertensive urgency   Hypertension   - /94 on admission, increased to 204/100 an hour later   - Improved with lasix  - Resumed home antihypertensive medications Losartan 100mg daily, Lopressor 50mg daily, Norvasc 5mg daily   - TSH and tropoin unremarkable    Prediabetes A1c 5.9  -  pt on lifestyle changes: exercise and diet changes  - continue to monitor with PCP outpt and consider metformin     Hyperlipidemia  - On Simvastatin 40 mg daily at home  -  Lipid panel  Tchole 176  HDL 64  LDL 95  TG 87  - Simvastatin not on formulary, Atorvastatin 20mg daily while inpatient     Anxiety  - Continue home Sertraline 100mg daily while inpatient      Subclinical hypothryoid   TSH 5.9 with T4 0.96    Healthcare Maintenance  - Needs tdap, flu, covid  booster   - colonoscopy as outpatient     Diet: Cardiac  DVT: scd  IVF: none  Dispo: >48 hours    Parminder Patel MD  [facility]    [facility] Hospitalist Pager numbers:   [facility] Hospitalist Medicine Team A ([place]/Rashaun): [phone]  [facility] Hospitalist Medicine Team B (Norberto/Pricila):  [phone]

## 2022-08-03 NOTE — PLAN OF CARE
VN note: labs, notes orders, care plan review will be available as needed.   Problem: Adult Inpatient Plan of Care  Goal: Plan of Care Review  Outcome: Ongoing, Progressing

## 2022-08-03 NOTE — PROGRESS NOTES
[facility]                    Pharmacy       Discharge Medication Education    Patient ACCEPTED medication education. Pharmacy has provided education on the name, indication, and possible side effects of the medication(s) prescribed, using teach-back method.     The following medications have also been discussed, during this admission.        Medication List        START taking these medications      carvediloL 6.25 MG tablet  Commonly known as: COREG  Take 1 tablet (6.25 mg total) by mouth 2 (two) times daily with meals.     gabapentin 300 MG capsule  Commonly known as: NEURONTIN  Take 1 capsule (300 mg total) by mouth 3 (three) times daily for 3 days, THEN 1 capsule (300 mg total) 2 (two) times daily for 2 days.  Start taking on: August 3, 2022            CHANGE how you take these medications      amLODIPine 10 MG tablet  Commonly known as: NORVASC  Take 1 tablet (10 mg total) by mouth once daily.  What changed:   medication strength  how much to take            CONTINUE taking these medications      aspirin 81 MG Chew     losartan 100 MG tablet  Commonly known as: COZAAR     sertraline 100 MG tablet  Commonly known as: ZOLOFT     simvastatin 40 MG tablet  Commonly known as: ZOCOR            STOP taking these medications      metoprolol tartrate 50 MG tablet  Commonly known as: LOPRESSOR               Where to Get Your Medications        These medications were sent to [facility]  [address], [place] [address]      Hours: Mon-Fri, 8a-5:30p Phone: [phone]   amLODIPine 10 MG tablet  carvediloL 6.25 MG tablet  gabapentin 300 MG capsule          Thank you  Honorio Seaman, PharmD  [phone]

## 2022-08-04 ENCOUNTER — TELEPHONE (OUTPATIENT)
Dept: ADMINISTRATIVE | Facility: OTHER | Age: 57
End: 2022-08-04
Payer: COMMERCIAL

## 2022-08-04 NOTE — PLAN OF CARE
[facility] - Telemetry  Discharge Final Note    Primary Care Provider: No primary care provider on file.    Expected Discharge Date: 8/3/2022    Final Discharge Note (most recent)       Final Note - 08/04/22 0738          Final Note    Assessment Type Final Discharge Note (P)      Anticipated Discharge Disposition Home or Self Care (P)                      Important Message from Medicare             Contact Info       [facility]    [facility] [place] [address]  Phone: [phone]  Toll Free: [phone]  [email or web address]       Next Steps: Call    Instructions: As needed    [phone] ([phone])     Family Health Helpline, Website: www.[facility].org  Services: Health information (including mental health) in Romanian and English, Assistance in finding low-cost Romanian speaking services; Romanian spoken       Next Steps: Call    Instructions: As needed    [employer] [facility] ([facility])    Community Resource: Financial Assistance, COVID testing, resources for families   https://ovnv.org/resource-hub    Non-profit organization in [place], [place]  Address: [address] [place] [address]  Phone: [phone]       Next Steps: Call    Instructions: As needed    Dr. Nathen Lieberman    [address] [place] [address], [address]  (p) [phone]  (f) [phone]       Next Steps: Schedule an appointment as soon as possible for a visit in 1 week(s)    Instructions: Call to schedule a hospital follow up appointment in 1 week.          Discharge summary faxed to Dr Nathen Lieberman (PCP/card) f [phone].

## 2022-08-08 LAB — VIT B1 BLD-MCNC: 66 UG/L (ref 38–122)
